# Patient Record
Sex: FEMALE | Race: WHITE | NOT HISPANIC OR LATINO | Employment: FULL TIME | URBAN - METROPOLITAN AREA
[De-identification: names, ages, dates, MRNs, and addresses within clinical notes are randomized per-mention and may not be internally consistent; named-entity substitution may affect disease eponyms.]

---

## 2024-11-24 ENCOUNTER — APPOINTMENT (OUTPATIENT)
Dept: RADIOLOGY | Facility: MEDICAL CENTER | Age: 34
End: 2024-11-24
Payer: COMMERCIAL

## 2024-11-24 ENCOUNTER — OFFICE VISIT (OUTPATIENT)
Dept: URGENT CARE | Facility: PHYSICIAN GROUP | Age: 34
End: 2024-11-24
Payer: COMMERCIAL

## 2024-11-24 VITALS
DIASTOLIC BLOOD PRESSURE: 70 MMHG | OXYGEN SATURATION: 98 % | RESPIRATION RATE: 17 BRPM | BODY MASS INDEX: 37.53 KG/M2 | HEIGHT: 60 IN | TEMPERATURE: 98.3 F | WEIGHT: 191.14 LBS | SYSTOLIC BLOOD PRESSURE: 126 MMHG | HEART RATE: 82 BPM

## 2024-11-24 DIAGNOSIS — R10.11 POSTPRANDIAL RUQ PAIN: ICD-10-CM

## 2024-11-24 DIAGNOSIS — R10.11 RUQ ABDOMINAL PAIN: ICD-10-CM

## 2024-11-24 DIAGNOSIS — K59.00 CONSTIPATION, UNSPECIFIED CONSTIPATION TYPE: ICD-10-CM

## 2024-11-24 LAB
APPEARANCE UR: CLEAR
BILIRUB UR STRIP-MCNC: NEGATIVE MG/DL
COLOR UR AUTO: YELLOW
GLUCOSE UR STRIP.AUTO-MCNC: NEGATIVE MG/DL
KETONES UR STRIP.AUTO-MCNC: NEGATIVE MG/DL
LEUKOCYTE ESTERASE UR QL STRIP.AUTO: NORMAL
NITRITE UR QL STRIP.AUTO: NEGATIVE
PH UR STRIP.AUTO: 6 [PH] (ref 5–8)
POCT INT CON NEG: NEGATIVE
POCT INT CON POS: POSITIVE
POCT URINE PREGNANCY TEST: NEGATIVE
PROT UR QL STRIP: NEGATIVE MG/DL
RBC UR QL AUTO: NORMAL
SP GR UR STRIP.AUTO: 1.01
UROBILINOGEN UR STRIP-MCNC: 0.2 MG/DL

## 2024-11-24 PROCEDURE — 99204 OFFICE O/P NEW MOD 45 MIN: CPT

## 2024-11-24 PROCEDURE — 81002 URINALYSIS NONAUTO W/O SCOPE: CPT

## 2024-11-24 PROCEDURE — 3078F DIAST BP <80 MM HG: CPT

## 2024-11-24 PROCEDURE — 81025 URINE PREGNANCY TEST: CPT

## 2024-11-24 PROCEDURE — 3074F SYST BP LT 130 MM HG: CPT

## 2024-11-24 RX ORDER — SPIRONOLACTONE 100 MG/1
100 TABLET, FILM COATED ORAL
COMMUNITY
Start: 2024-08-27

## 2024-11-24 RX ORDER — DOCUSATE SODIUM 100 MG/1
100 CAPSULE, LIQUID FILLED ORAL 2 TIMES DAILY PRN
Qty: 30 CAPSULE | Refills: 0 | Status: SHIPPED | OUTPATIENT
Start: 2024-11-24

## 2024-11-24 RX ORDER — SENNOSIDES A AND B 8.6 MG/1
8.6 TABLET, FILM COATED ORAL
Qty: 30 TABLET | Refills: 0 | Status: SHIPPED | OUTPATIENT
Start: 2024-11-24

## 2024-11-24 RX ORDER — LEVOTHYROXINE SODIUM 100 UG/1
100 TABLET ORAL EVERY MORNING
COMMUNITY
Start: 2024-08-27

## 2024-11-24 ASSESSMENT — ENCOUNTER SYMPTOMS
DIZZINESS: 0
ABDOMINAL PAIN: 1
BLOOD IN STOOL: 0
HEADACHES: 0
VOMITING: 0
HEMATOCHEZIA: 0
MYALGIAS: 0
FEVER: 0
ARTHRALGIAS: 0
DIARRHEA: 0
NAUSEA: 0
BACK PAIN: 0
HEARTBURN: 0
WEIGHT LOSS: 0
BELCHING: 0
NECK PAIN: 0
FLATUS: 0
FLANK PAIN: 0
CHILLS: 0
CONSTIPATION: 1
ANOREXIA: 0

## 2024-11-24 NOTE — PROGRESS NOTES
"Subjective:   Setphanie Garcia is a 34 y.o. female who presents for Abdominal Pain (Mild symptoms Upper abdominal pain she thinks maybe gold bladder x  9 months.  but 30 minutes after she ate on Thursday she starting having extreme pain with some nausea, diarrhea, headaches, heart burn. It looks to be a constantly pain after she eats. Heart burn x 9 months)      Patient presents with complaints of right upper abdominal pain for the last 9 months.  Patient states symptoms have been on and off again and she has been able to identify a pattern and pain as it is related to her eating.  Patient states she is primarily noticed an increase in pain with fatty foods or when eating out.  States that 3 days prior to today's visit she had eaten a burrito and had \"some of the most intense pain/episode\" that she has had since this started.  She also complains of concomitant constipation and states that she goes only twice a week or so.  She does state that she has some slight radiation of pain to her left upper quadrant but no other areas.  She does have a significant history of PCOS.  She denies fever, chills, body aches currently, no hematochezia, nausea vomiting or hematemesis.  She denies any dysuria    Abdominal Pain  This is a chronic problem. The current episode started more than 1 month ago. The onset quality is gradual. The problem occurs daily. The problem has been gradually worsening. The pain is located in the RUQ and RLQ. The pain is at a severity of 7/10. The pain is moderate. The quality of the pain is aching, cramping and a sensation of fullness. The abdominal pain radiates to the LUQ. Associated symptoms include constipation. Pertinent negatives include no anorexia, arthralgias, belching, diarrhea, dysuria, fever, flatus, frequency, headaches, hematochezia, hematuria, melena, myalgias, nausea, vomiting or weight loss. The pain is aggravated by eating. The pain is relieved by Nothing. She has tried nothing for " the symptoms.       Review of Systems   Constitutional:  Negative for chills, fever and weight loss.   Gastrointestinal:  Positive for abdominal pain and constipation. Negative for anorexia, blood in stool, diarrhea, flatus, heartburn, hematochezia, melena, nausea and vomiting.   Genitourinary:  Negative for dysuria, flank pain, frequency, hematuria and urgency.   Musculoskeletal:  Negative for arthralgias, back pain, myalgias and neck pain.   Neurological:  Negative for dizziness and headaches.   All other systems reviewed and are negative.    Refer to HPI for additional details.    During this visit, appropriate PPE was worn, and hand hygiene was performed.    PMH:  has no past medical history on file.    MEDS:   Current Outpatient Medications:     spironolactone (ALDACTONE) 100 MG Tab, Take 100 mg by mouth every day., Disp: , Rfl:     levothyroxine (SYNTHROID) 100 MCG Tab, Take 100 mcg by mouth every morning., Disp: , Rfl:     metFORMIN (GLUCOPHAGE) 500 MG Tab, Take 500 mg by mouth 2 times a day with meals., Disp: , Rfl:     docusate sodium (COLACE) 100 MG Cap, Take 1 Capsule by mouth 2 times a day as needed for Constipation., Disp: 30 Capsule, Rfl: 0    sennosides (SENOKOT) 8.6 MG Tab, Take 1 Tablet by mouth 1 time a day as needed (constipation not resolved with colace or diet)., Disp: 30 Tablet, Rfl: 0    ALLERGIES: No Known Allergies  SURGHX: History reviewed. No pertinent surgical history.  SOCHX:  reports that she has never smoked. She has never used smokeless tobacco. She reports that she does not currently use alcohol. She reports current drug use. Drugs: Marijuana and Oral.    FH: Per HPI as applicable/pertinent.    Medications, Allergies, and current problem list reviewed today in Epic.     Objective:     /70   Pulse 82   Temp 36.8 °C (98.3 °F)   Resp 17   Ht 1.524 m (5')   Wt 86.7 kg (191 lb 2.2 oz)   SpO2 98%     Physical Exam  Vitals reviewed.   Constitutional:       General: She is not in  acute distress.     Appearance: She is normal weight. She is not ill-appearing.   HENT:      Head: Normocephalic and atraumatic.      Right Ear: Tympanic membrane, ear canal and external ear normal.      Left Ear: Tympanic membrane, ear canal and external ear normal.      Nose: No congestion or rhinorrhea.      Mouth/Throat:      Mouth: Mucous membranes are moist.      Pharynx: No oropharyngeal exudate or posterior oropharyngeal erythema.   Eyes:      General:         Right eye: No discharge.         Left eye: No discharge.      Pupils: Pupils are equal, round, and reactive to light.   Cardiovascular:      Rate and Rhythm: Normal rate.   Pulmonary:      Effort: Pulmonary effort is normal. No respiratory distress.      Breath sounds: No stridor. No wheezing, rhonchi or rales.   Chest:      Chest wall: No tenderness.   Abdominal:      General: Abdomen is flat. There is distension.      Palpations: There is no mass.      Tenderness: There is abdominal tenderness in the right upper quadrant. There is no right CVA tenderness, left CVA tenderness, guarding or rebound. Negative signs include Aguiar's sign, Rovsing's sign, McBurney's sign, psoas sign and obturator sign.      Hernia: No hernia is present.       Musculoskeletal:      Cervical back: Normal range of motion. No rigidity or tenderness.   Lymphadenopathy:      Cervical: No cervical adenopathy.   Neurological:      Mental Status: She is alert.         Assessment/Plan:     Diagnosis and associated orders:     1. RUQ abdominal pain  - CT-ABDOMEN-PELVIS W/O  - POCT Pregnancy  - POCT Urinalysis    2. Postprandial RUQ pain  - CT-ABDOMEN-PELVIS W/O  - POCT Pregnancy  - POCT Urinalysis    3. Constipation, unspecified constipation type  - CT-ABDOMEN-PELVIS W/O  - docusate sodium (COLACE) 100 MG Cap; Take 1 Capsule by mouth 2 times a day as needed for Constipation.  Dispense: 30 Capsule; Refill: 0  - sennosides (SENOKOT) 8.6 MG Tab; Take 1 Tablet by mouth 1 time a day as  needed (constipation not resolved with colace or diet).  Dispense: 30 Tablet; Refill: 0    Other orders  - spironolactone (ALDACTONE) 100 MG Tab; Take 100 mg by mouth every day.  - levothyroxine (SYNTHROID) 100 MCG Tab; Take 100 mcg by mouth every morning.  - metFORMIN (GLUCOPHAGE) 500 MG Tab; Take 500 mg by mouth 2 times a day with meals.     Comments/MDM:     Patient history and physical exam are consistent with acute on chronic right upper quadrant pain with concomitant radiation to the left upper quadrant.  Patient presents in clinic well and in no obvious distress.  Vital signs are stable and otherwise her physical exam is normal aside from tenderness in the right upper quadrant.  Patient has identified eating and specifically eating fried fatty foods as a trigger for this pain.  Given patient's benign presentation clinic, I do think imaging within the next 48 hours is warranted.  CT abdomen pelvis scheduled for 11/25/2024 at 7 AM   Patient had also identified that she has been constipated quite frequently usually only take about 2 bowel movements per week.  Will write for Colace and senna to use as needed for constipation.  Encouraged copious hydration and electrolytes, can also use fiber supplement as needed  Outpatient management will consist of imaging, docusate/senna as needed, increase hydration, increase fiber intake can use supplement if need be, avoid triggering foods.  Follow up in 3-5 days if no improvement in symptoms  ED precautions given for severe increase in pain, fever, chills, body aches, hematochezia, hematemesis  Discussion and collaborative decision making used with the patient myself to develop treatment plan.  Patient understands the treatment plan of care, and further follow-up if needed.  No further questions           Differential diagnosis, natural history, supportive care, and indications for immediate follow-up discussed.    Advised the patient to follow-up with the primary care  physician for recheck, reevaluation, and consideration of further management.    Please note that this dictation was created using voice recognition software. I have made a reasonable attempt to correct obvious errors, but I expect that there are errors of grammar and possibly content that I did not discover before finalizing the note.    This note was electronically signed by GURMEET Wilkerson

## 2024-11-25 ENCOUNTER — TELEPHONE (OUTPATIENT)
Dept: URGENT CARE | Facility: CLINIC | Age: 34
End: 2024-11-25
Payer: COMMERCIAL

## 2024-11-25 ENCOUNTER — HOSPITAL ENCOUNTER (OUTPATIENT)
Dept: RADIOLOGY | Facility: MEDICAL CENTER | Age: 34
End: 2024-11-25
Payer: COMMERCIAL

## 2024-11-25 DIAGNOSIS — R10.11 RIGHT UPPER QUADRANT ABDOMINAL PAIN: ICD-10-CM

## 2024-11-25 DIAGNOSIS — R16.0 HEPATOMEGALY: ICD-10-CM

## 2024-11-25 DIAGNOSIS — R10.11 POSTPRANDIAL ABDOMINAL PAIN IN RIGHT UPPER QUADRANT: ICD-10-CM

## 2024-11-25 PROCEDURE — 74176 CT ABD & PELVIS W/O CONTRAST: CPT

## 2024-11-25 NOTE — TELEPHONE ENCOUNTER
Telephone Appointment Visit   This telephone visit was initiated by the patient and they verbally consented.    Reason for Call: CT abdomen    HPI:    Right upper quadrant pain x 9 months on and off, worse with eating    Labs / Images Reviewed:   CT abdomen pelvis was overall normal did show some hepatomegaly.    Assessment and Plan:     1. Hepatomegaly  - Referral to Gastroenterology    2. Right upper quadrant abdominal pain  - Referral to Gastroenterology    3. Postprandial abdominal pain in right upper quadrant  - Referral to Gastroenterology      Follow-up:     Overall normal CT abdomen pelvis, did call patient discussed findings of hepatomegaly.  No emergent findings or worrisome findings at this time.  Did reiterate with patient that would be a good idea to keep either mental note or actual note of when symptoms are worse, better, any triggers.  Reiterated using medications as well as diet modifications and increasing fiber to help with constipation as well as increasing fluid intake.  Patient has follow-up appointment with her primary care to establish care on 11/27/2024.  Advised her to follow-up with this appointment  I will put in a GI referral at this time, advised patient to follow-up with GI for further evaluation    Total Time Spent (mins): 10    GURMEET Wilkerson

## 2024-12-05 ENCOUNTER — HOSPITAL ENCOUNTER (OUTPATIENT)
Dept: RADIOLOGY | Facility: MEDICAL CENTER | Age: 34
End: 2024-12-05
Attending: FAMILY MEDICINE
Payer: COMMERCIAL

## 2024-12-05 DIAGNOSIS — R10.11 ABDOMINAL PAIN, RIGHT UPPER QUADRANT: ICD-10-CM

## 2024-12-05 PROCEDURE — 76705 ECHO EXAM OF ABDOMEN: CPT

## 2025-01-27 ENCOUNTER — HOSPITAL ENCOUNTER (OUTPATIENT)
Dept: RADIOLOGY | Facility: MEDICAL CENTER | Age: 35
End: 2025-01-27
Attending: FAMILY MEDICINE
Payer: COMMERCIAL

## 2025-01-27 DIAGNOSIS — R10.11 RUQ PAIN: ICD-10-CM
